# Patient Record
Sex: MALE | URBAN - METROPOLITAN AREA
[De-identification: names, ages, dates, MRNs, and addresses within clinical notes are randomized per-mention and may not be internally consistent; named-entity substitution may affect disease eponyms.]

---

## 2022-07-01 ENCOUNTER — OFFICE VISIT (OUTPATIENT)
Dept: URBAN - METROPOLITAN AREA CLINIC 60 | Facility: CLINIC | Age: 57
End: 2022-07-01
Payer: COMMERCIAL

## 2022-07-01 DIAGNOSIS — H25.813 COMBINED FORMS OF AGE-RELATED CATARACT, BILATERAL: Primary | ICD-10-CM

## 2022-07-01 PROCEDURE — 99204 OFFICE O/P NEW MOD 45 MIN: CPT | Performed by: OPHTHALMOLOGY

## 2022-07-01 ASSESSMENT — INTRAOCULAR PRESSURE
OD: 16
OS: 17

## 2022-07-01 ASSESSMENT — VISUAL ACUITY
OD: 20/30
OS: 20/200

## 2022-07-01 ASSESSMENT — KERATOMETRY
OD: 41.11
OS: 41.27

## 2022-07-01 NOTE — IMPRESSION/PLAN
Impression: Combined forms of age-related cataract, bilateral: H25.813. Plan: Cataract accounts for pt complaints. Pt desires sx. Schedule CE/IOL left eye. Risk/Benefits/Alternatives discussed with patient. Rec. mono-focal/Toric/Vivity. Consider ORA/LRI. RL2. Target distance. Order a-scan. Patient is a candidate for Dexycu/Dextenza. Discussed R/B/A. Recommend Ofloxacin or Tobramycin QID for 1 week postop. Intra-cameral Moxifloxacin to decrease the risk of infection. May elect to use combination drops or generics per patient preference.

## 2022-07-19 ENCOUNTER — TESTING ONLY (OUTPATIENT)
Dept: URBAN - METROPOLITAN AREA CLINIC 60 | Facility: CLINIC | Age: 57
End: 2022-07-19
Payer: COMMERCIAL

## 2022-07-19 DIAGNOSIS — H25.813 COMBINED FORMS OF AGE-RELATED CATARACT, BILATERAL: ICD-10-CM

## 2022-07-19 DIAGNOSIS — H25.812 COMBINED FORMS OF AGE-RELATED CATARACT, LEFT EYE: Primary | ICD-10-CM

## 2022-07-19 ASSESSMENT — PACHYMETRY
OS: 3.79
OD: 3.40
OD: 25.71
OS: 25.69